# Patient Record
Sex: FEMALE | Race: WHITE | NOT HISPANIC OR LATINO | Employment: OTHER | URBAN - METROPOLITAN AREA
[De-identification: names, ages, dates, MRNs, and addresses within clinical notes are randomized per-mention and may not be internally consistent; named-entity substitution may affect disease eponyms.]

---

## 2024-07-10 ENCOUNTER — OFFICE VISIT (OUTPATIENT)
Dept: OBSTETRICS AND GYNECOLOGY | Facility: HOSPITAL | Age: 39
End: 2024-07-10
Payer: COMMERCIAL

## 2024-07-10 ENCOUNTER — LAB (OUTPATIENT)
Dept: LAB | Facility: HOSPITAL | Age: 39
End: 2024-07-10
Payer: COMMERCIAL

## 2024-07-10 VITALS
HEIGHT: 66 IN | SYSTOLIC BLOOD PRESSURE: 116 MMHG | BODY MASS INDEX: 27.58 KG/M2 | DIASTOLIC BLOOD PRESSURE: 76 MMHG | HEART RATE: 60 BPM | WEIGHT: 171.6 LBS

## 2024-07-10 DIAGNOSIS — D69.6 THROMBOCYTOPENIA AFFECTING PREGNANCY: ICD-10-CM

## 2024-07-10 DIAGNOSIS — A60.00 GENITAL HERPES SIMPLEX, UNSPECIFIED SITE: ICD-10-CM

## 2024-07-10 DIAGNOSIS — O99.119 THROMBOCYTOPENIA AFFECTING PREGNANCY: ICD-10-CM

## 2024-07-10 DIAGNOSIS — Z98.891 PREVIOUS CESAREAN SECTION: Primary | ICD-10-CM

## 2024-07-10 DIAGNOSIS — O09.529 ANTEPARTUM MULTIGRAVIDA OF ADVANCED MATERNAL AGE: ICD-10-CM

## 2024-07-10 DIAGNOSIS — E07.9 DISEASE OF THYROID GLAND: ICD-10-CM

## 2024-07-10 LAB
DEPRECATED RDW RBC AUTO: 39.7 FL (ref 37–54)
ERYTHROCYTE [DISTWIDTH] IN BLOOD BY AUTOMATED COUNT: 11.7 % (ref 12.3–15.4)
HCT VFR BLD AUTO: 44.2 % (ref 34–46.6)
HGB BLD-MCNC: 14.9 G/DL (ref 12–15.9)
MCH RBC QN AUTO: 31.2 PG (ref 26.6–33)
MCHC RBC AUTO-ENTMCNC: 33.7 G/DL (ref 31.5–35.7)
MCV RBC AUTO: 92.7 FL (ref 79–97)
PLATELET # BLD AUTO: 199 10*3/MM3 (ref 140–450)
PMV BLD AUTO: 12.6 FL (ref 6–12)
RBC # BLD AUTO: 4.77 10*6/MM3 (ref 3.77–5.28)
WBC NRBC COR # BLD AUTO: 4.89 10*3/MM3 (ref 3.4–10.8)

## 2024-07-10 PROCEDURE — 36415 COLL VENOUS BLD VENIPUNCTURE: CPT

## 2024-07-10 PROCEDURE — 85027 COMPLETE CBC AUTOMATED: CPT

## 2024-07-10 RX ORDER — LEVOTHYROXINE SODIUM 0.07 MG/1
1 TABLET ORAL DAILY
COMMUNITY
Start: 2024-06-18

## 2024-07-10 NOTE — LETTER
2024       No Recipients    Patient: Jenna Shankar   YOB: 1985   Date of Visit: 7/10/2024       Dear Anni Rosen,    Thank you for referring Jenna Shankar to me for evaluation. Below is a copy of my consult note.    If you have questions, please do not hesitate to call me. I look forward to following Jenna along with you.         Sincerely,        Susie Baltazar MD        CC:   No Recipients        Maternal/Fetal Medicine Consult Note     Name: Jenna Shankar    : 1985     MRN: [id number]     Referring Provider: DEVYN Rios    Chief Complaint  Advice Only (Preconceptual counseling)    Subjective     History of Present Illness:  Jenna Shankar is a 38 y.o.  Unknown who presents today for evaluation in the setting of planned surrogacy. Patient plans to be a gestational carrier.   Patient with history of two prior uncomplicated pregnancies in  and . Both delivered by CS. The second was affected by mild gestational thrombocytopenia. She did not require steroids and was able to receive a spinal.   She has a history of genital herpes, hypothyroidism and AMA.   Her levothyroxine was recently adjusted. She has not had a recent outbreak of herpes, is not on suppression at this time but is willing to take it.       SYLVESTER: Estimated Date of Delivery: None noted.     ROS:   As noted in HPI.     Past Medical History:   Diagnosis Date   • Disease of thyroid gland     dx age20, hypothyroid   • Genital herpes 2024    outbreak; pt states  has cold sores   • Heterozygous for MTHFR gene mutation       Past Surgical History:   Procedure Laterality Date   • BREAST SURGERY Left     lumpectomy   • CHOLECYSTECTOMY     • TONSILLECTOMY        OB History          2    Para   2    Term   2       0    AB   0    Living   2         SAB   0    IAB   0    Ectopic   0    Molar   0    Multiple   0    Live Births   2                Current Outpatient  "Medications:   •  levothyroxine (SYNTHROID, LEVOTHROID) 75 MCG tablet, Take 1 tablet by mouth Daily., Disp: , Rfl:     Objective     Vital Signs  /76   Pulse 60   Ht 167.6 cm (66\")   Wt 77.8 kg (171 lb 9.6 oz)   LMP 2024 (Exact Date)   Estimated body mass index is 27.7 kg/m² as calculated from the following:    Height as of this encounter: 167.6 cm (66\").    Weight as of this encounter: 77.8 kg (171 lb 9.6 oz).    Physical Exam  NAD   Normal pulmonary effort   Normal habitus     Labs:     CBC:   Lab Results   Component Value Date    WBC 4.89 07/10/2024    HGB 14.9 07/10/2024    HCT 44.2 07/10/2024     07/10/2024           Assessment and Plan     38 y.o.  with plans to be a gestational carrier   I do not see a contraindication to this plan   We discussed the following issues:     Diagnoses and all orders for this visit:    1. Previous  section (Primary)    2. Genital herpes simplex, unspecified site  Assessment & Plan:  Recommend suppression starting at 35 weeks GA       3. Disease of thyroid gland  Assessment & Plan:  Reports well controlled at this point.   Recommend goal TSH < 2.5.   Recommend checking TSH q trimester at a minimum.       4. Thrombocytopenia affecting pregnancy  Assessment & Plan:  Reports history of mild gestational thrombocytopenia.   No history of ITP.   Newborns were unaffected.   CBC today with normal platelets.       Orders:  -     CBC (No Diff); Future    5. Antepartum multigravida of advanced maternal age  Assessment & Plan:  Reviewed increased risk of fetal aneuploidy though this is not applicable in the case of surrogacy.   Discussed the increased risk of preeclampsia, GHTN, GDM, IUGR and  delivery.   Recommend starting a low dose ASA at 12 weeks GA and serial growth scans.            Follow Up  No follow-ups on file.    I spent 30 minutes caring for the patient on the day of service. This included: obtaining or reviewing a separately obtained " medical history, reviewing patient records, performing a medically appropriate exam and/or evaluation, counseling or educating the patient/family/caregiver, ordering medications, labs, and/or procedures and documenting such in the medical record. This does not include time spent on review and interpretation of other tests such as fetal ultrasound or the performance of other procedures such as amniocentesis or CVS.    Susie Baltazar MD FACOG  Maternal Fetal Medicine, [facility] [address]     07/10/2024

## 2024-07-14 NOTE — ASSESSMENT & PLAN NOTE
Reports well controlled at this point.   Recommend goal TSH < 2.5.   Recommend checking TSH q trimester at a minimum.

## 2024-07-14 NOTE — ASSESSMENT & PLAN NOTE
Reviewed increased risk of fetal aneuploidy though this is not applicable in the case of surrogacy.   Discussed the increased risk of preeclampsia, GHTN, GDM, IUGR and  delivery.   Recommend starting a low dose ASA at 12 weeks GA and serial growth scans.

## 2024-07-14 NOTE — PROGRESS NOTES
"    Maternal/Fetal Medicine Consult Note     Name: Jenna Shankar    : 1985     MRN: [id number]     Referring Provider: DEVYN Rios    Chief Complaint  Advice Only (Preconceptual counseling)    Subjective     History of Present Illness:  Jenna Shankar is a 38 y.o.  Unknown who presents today for evaluation in the setting of planned surrogacy. Patient plans to be a gestational carrier.   Patient with history of two prior uncomplicated pregnancies in  and . Both delivered by CS. The second was affected by mild gestational thrombocytopenia. She did not require steroids and was able to receive a spinal.   She has a history of genital herpes, hypothyroidism and AMA.   Her levothyroxine was recently adjusted. She has not had a recent outbreak of herpes, is not on suppression at this time but is willing to take it.       SYLVESTER: Estimated Date of Delivery: None noted.     ROS:   As noted in HPI.     Past Medical History:   Diagnosis Date    Disease of thyroid gland     dx age20, hypothyroid    Genital herpes 2024    outbreak; pt states  has cold sores    Heterozygous for MTHFR gene mutation       Past Surgical History:   Procedure Laterality Date    BREAST SURGERY Left     lumpectomy    CHOLECYSTECTOMY      TONSILLECTOMY        OB History          2    Para   2    Term   2       0    AB   0    Living   2         SAB   0    IAB   0    Ectopic   0    Molar   0    Multiple   0    Live Births   2                Current Outpatient Medications:     levothyroxine (SYNTHROID, LEVOTHROID) 75 MCG tablet, Take 1 tablet by mouth Daily., Disp: , Rfl:     Objective     Vital Signs  /76   Pulse 60   Ht 167.6 cm (66\")   Wt 77.8 kg (171 lb 9.6 oz)   LMP 2024 (Exact Date)   Estimated body mass index is 27.7 kg/m² as calculated from the following:    Height as of this encounter: 167.6 cm (66\").    Weight as of this encounter: 77.8 kg (171 lb 9.6 oz).    Physical " Exam  NAD   Normal pulmonary effort   Normal habitus     Labs:     CBC:   Lab Results   Component Value Date    WBC 4.89 07/10/2024    HGB 14.9 07/10/2024    HCT 44.2 07/10/2024     07/10/2024           Assessment and Plan     38 y.o.  with plans to be a gestational carrier   I do not see a contraindication to this plan   We discussed the following issues:     Diagnoses and all orders for this visit:    1. Previous  section (Primary)    2. Genital herpes simplex, unspecified site  Assessment & Plan:  Recommend suppression starting at 35 weeks GA       3. Disease of thyroid gland  Assessment & Plan:  Reports well controlled at this point.   Recommend goal TSH < 2.5.   Recommend checking TSH q trimester at a minimum.       4. Thrombocytopenia affecting pregnancy  Assessment & Plan:  Reports history of mild gestational thrombocytopenia.   No history of ITP.   Newborns were unaffected.   CBC today with normal platelets.       Orders:  -     CBC (No Diff); Future    5. Antepartum multigravida of advanced maternal age  Assessment & Plan:  Reviewed increased risk of fetal aneuploidy though this is not applicable in the case of surrogacy.   Discussed the increased risk of preeclampsia, GHTN, GDM, IUGR and  delivery.   Recommend starting a low dose ASA at 12 weeks GA and serial growth scans.            Follow Up  No follow-ups on file.    I spent 30 minutes caring for the patient on the day of service. This included: obtaining or reviewing a separately obtained medical history, reviewing patient records, performing a medically appropriate exam and/or evaluation, counseling or educating the patient/family/caregiver, ordering medications, labs, and/or procedures and documenting such in the medical record. This does not include time spent on review and interpretation of other tests such as fetal ultrasound or the performance of other procedures such as amniocentesis or CVS.    Susie Baltazar,  MD WHITE  Maternal Fetal Medicine, [facility]     07/10/2024

## 2024-07-14 NOTE — ASSESSMENT & PLAN NOTE
Reports history of mild gestational thrombocytopenia.   No history of ITP.   Newborns were unaffected.   CBC today with normal platelets.

## 2024-07-31 ENCOUNTER — TELEPHONE (OUTPATIENT)
Dept: OBSTETRICS AND GYNECOLOGY | Facility: HOSPITAL | Age: 39
End: 2024-07-31
Payer: COMMERCIAL

## 2024-07-31 NOTE — TELEPHONE ENCOUNTER
PT STATES NEEDS ADDITIONAL NOTES FAXED TO [facility]. PT STATES DR SAMS HAS EMAIL AND FAX NUMBER ALREADY.

## 2025-02-07 ENCOUNTER — TRANSCRIBE ORDERS (OUTPATIENT)
Dept: LAB | Facility: HOSPITAL | Age: 40
End: 2025-02-07

## 2025-02-07 ENCOUNTER — LAB (OUTPATIENT)
Dept: LAB | Facility: HOSPITAL | Age: 40
End: 2025-02-07

## 2025-02-07 ENCOUNTER — APPOINTMENT (OUTPATIENT)
Dept: LAB | Facility: HOSPITAL | Age: 40
End: 2025-02-07

## 2025-02-07 DIAGNOSIS — Z33.3 SURROGATE PREGNANCY: ICD-10-CM

## 2025-02-07 DIAGNOSIS — Z33.3 SURROGATE PREGNANCY: Primary | ICD-10-CM

## 2025-02-07 LAB
ESTRADIOL SERPL HS-MCNC: 184 PG/ML
HCG INTACT+B SERPL-ACNC: 65.54 MIU/ML
PROGEST SERPL-MCNC: 17.6 NG/ML

## 2025-02-07 PROCEDURE — 36415 COLL VENOUS BLD VENIPUNCTURE: CPT

## 2025-02-07 PROCEDURE — 84144 ASSAY OF PROGESTERONE: CPT

## 2025-02-07 PROCEDURE — 84702 CHORIONIC GONADOTROPIN TEST: CPT

## 2025-02-07 PROCEDURE — 82670 ASSAY OF TOTAL ESTRADIOL: CPT

## 2025-02-12 ENCOUNTER — TRANSCRIBE ORDERS (OUTPATIENT)
Dept: LAB | Facility: HOSPITAL | Age: 40
End: 2025-02-12

## 2025-02-12 ENCOUNTER — LAB (OUTPATIENT)
Dept: LAB | Facility: HOSPITAL | Age: 40
End: 2025-02-12

## 2025-02-12 DIAGNOSIS — Z34.00 SUPERVISION OF NORMAL FIRST PREGNANCY, ANTEPARTUM: Primary | ICD-10-CM

## 2025-02-12 DIAGNOSIS — Z34.00 SUPERVISION OF NORMAL FIRST PREGNANCY, ANTEPARTUM: ICD-10-CM

## 2025-02-12 LAB
HCG INTACT+B SERPL-ACNC: 605 MIU/ML
PROGEST SERPL-MCNC: 24.2 NG/ML

## 2025-02-12 PROCEDURE — 84702 CHORIONIC GONADOTROPIN TEST: CPT

## 2025-02-12 PROCEDURE — 36415 COLL VENOUS BLD VENIPUNCTURE: CPT

## 2025-02-12 PROCEDURE — 84144 ASSAY OF PROGESTERONE: CPT

## 2025-03-23 ENCOUNTER — HOSPITAL ENCOUNTER (EMERGENCY)
Facility: HOSPITAL | Age: 40
Discharge: HOME | End: 2025-03-24
Attending: EMERGENCY MEDICINE | Admitting: EMERGENCY MEDICINE
Payer: COMMERCIAL

## 2025-03-23 DIAGNOSIS — R10.32 ACUTE BILATERAL LOWER ABDOMINAL PAIN: Primary | ICD-10-CM

## 2025-03-23 DIAGNOSIS — R10.31 ACUTE BILATERAL LOWER ABDOMINAL PAIN: Primary | ICD-10-CM

## 2025-03-23 DIAGNOSIS — N93.9 VAGINAL BLEEDING: ICD-10-CM

## 2025-03-23 LAB
ABO GROUP BLD: NORMAL
BASOPHILS # BLD AUTO: 0.03 10*3/MM3 (ref 0–0.2)
BASOPHILS NFR BLD AUTO: 0.3 % (ref 0–1.5)
DEPRECATED RDW RBC AUTO: 40.3 FL (ref 37–54)
EOSINOPHIL # BLD AUTO: 0.07 10*3/MM3 (ref 0–0.4)
EOSINOPHIL NFR BLD AUTO: 0.6 % (ref 0.3–6.2)
ERYTHROCYTE [DISTWIDTH] IN BLOOD BY AUTOMATED COUNT: 12 % (ref 12.3–15.4)
HCG INTACT+B SERPL-ACNC: NORMAL MIU/ML
HCT VFR BLD AUTO: 41.7 % (ref 34–46.6)
HGB BLD-MCNC: 14.1 G/DL (ref 12–15.9)
IMM GRANULOCYTES # BLD AUTO: 0.05 10*3/MM3 (ref 0–0.05)
IMM GRANULOCYTES NFR BLD AUTO: 0.4 % (ref 0–0.5)
LYMPHOCYTES # BLD AUTO: 1.12 10*3/MM3 (ref 0.7–3.1)
LYMPHOCYTES NFR BLD AUTO: 9.4 % (ref 19.6–45.3)
MCH RBC QN AUTO: 31.1 PG (ref 26.6–33)
MCHC RBC AUTO-ENTMCNC: 33.8 G/DL (ref 31.5–35.7)
MCV RBC AUTO: 91.9 FL (ref 79–97)
MONOCYTES # BLD AUTO: 0.56 10*3/MM3 (ref 0.1–0.9)
MONOCYTES NFR BLD AUTO: 4.7 % (ref 5–12)
NEUTROPHILS NFR BLD AUTO: 10.06 10*3/MM3 (ref 1.7–7)
NEUTROPHILS NFR BLD AUTO: 84.6 % (ref 42.7–76)
NRBC BLD AUTO-RTO: 0 /100 WBC (ref 0–0.2)
PLATELET # BLD AUTO: 159 10*3/MM3 (ref 140–450)
PMV BLD AUTO: 12.1 FL (ref 6–12)
RBC # BLD AUTO: 4.54 10*6/MM3 (ref 3.77–5.28)
RH BLD: POSITIVE
WBC NRBC COR # BLD AUTO: 11.89 10*3/MM3 (ref 3.4–10.8)

## 2025-03-23 PROCEDURE — 86901 BLOOD TYPING SEROLOGIC RH(D): CPT | Performed by: EMERGENCY MEDICINE

## 2025-03-23 PROCEDURE — 86900 BLOOD TYPING SEROLOGIC ABO: CPT | Performed by: EMERGENCY MEDICINE

## 2025-03-23 PROCEDURE — 85025 COMPLETE CBC W/AUTO DIFF WBC: CPT | Performed by: EMERGENCY MEDICINE

## 2025-03-23 PROCEDURE — 36415 COLL VENOUS BLD VENIPUNCTURE: CPT

## 2025-03-23 PROCEDURE — 99284 EMERGENCY DEPT VISIT MOD MDM: CPT

## 2025-03-23 PROCEDURE — 84702 CHORIONIC GONADOTROPIN TEST: CPT | Performed by: EMERGENCY MEDICINE

## 2025-03-23 RX ORDER — ESTERIFIED ESTROGEN AND METHYLTESTOSTERONE .625; 1.25 MG/1; MG/1
1 TABLET ORAL DAILY
COMMUNITY

## 2025-03-24 ENCOUNTER — APPOINTMENT (OUTPATIENT)
Dept: ULTRASOUND IMAGING | Facility: HOSPITAL | Age: 40
End: 2025-03-24
Payer: COMMERCIAL

## 2025-03-24 VITALS
WEIGHT: 182 LBS | BODY MASS INDEX: 29.25 KG/M2 | RESPIRATION RATE: 16 BRPM | TEMPERATURE: 98.4 F | DIASTOLIC BLOOD PRESSURE: 69 MMHG | OXYGEN SATURATION: 98 % | SYSTOLIC BLOOD PRESSURE: 115 MMHG | HEIGHT: 66 IN | HEART RATE: 64 BPM

## 2025-03-24 LAB — NUMBER OF DOSES: NORMAL

## 2025-03-24 PROCEDURE — 76817 TRANSVAGINAL US OBSTETRIC: CPT

## 2025-03-24 NOTE — ED PROVIDER NOTES
[place]    EMERGENCY DEPARTMENT ENCOUNTER      Pt Name: Jenna Shankar  MRN: [id number]  YOB: 1985  Date of evaluation: 3/23/2025  Provider: Wes Merrill MD    CHIEF COMPLAINT       Chief Complaint   Patient presents with    Vaginal Bleeding - Pregnant         HISTORY OF PRESENT ILLNESS   Jenna Shankar is a 39 y.o. female who presents to the emergency department with lower abdominal cramping and vaginal bleeding over the course the past day.  Patient is a surrogate.  She estimates she is about 10 weeks pregnant.  Was previously evaluated in the [place] emergency department for similar complaints and reports having an ultrasound that looked okay.  She feels that she passed some fetal tissue on the way here.  She has no ongoing abdominal pain, urinary symptoms, fever, or chills.      Nursing notes were reviewed.    REVIEW OF SYSTEMS     ROS:  A chief complaint appropriate review of systems was completed and is negative except as noted in the HPI.      PAST MEDICAL HISTORY     Past Medical History:   Diagnosis Date    Disease of thyroid gland     dx age18, hypothyroid    Genital herpes 04/2024    outbreak; pt states  has cold sores    Heterozygous for MTHFR gene mutation     Ovarian cyst          SURGICAL HISTORY       Past Surgical History:   Procedure Laterality Date    BREAST SURGERY Left     lumpectomy    CHOLECYSTECTOMY      TONSILLECTOMY           CURRENT MEDICATIONS     No current facility-administered medications for this encounter.    Current Outpatient Medications:     estrogens, conjugated,-methyltestosterone (ESTRATEST HS) 0.625-1.25 MG per tablet, Take 1 tablet by mouth Daily. (Patient not taking: Reported on 3/25/2025), Disp: , Rfl:     levothyroxine (SYNTHROID, LEVOTHROID) 112 MCG tablet, Take 1 tablet by mouth Daily., Disp: , Rfl:     ALLERGIES     Codeine and Penicillins    FAMILY HISTORY       Family History   Problem Relation Age of Onset    Breast cancer Maternal  Aunt     Ovarian cancer Neg Hx     Uterine cancer Neg Hx     Colon cancer Neg Hx           SOCIAL HISTORY       Social History     Socioeconomic History    Marital status:    Tobacco Use    Smoking status: Never    Smokeless tobacco: Never   Vaping Use    Vaping status: Never Used   Substance and Sexual Activity    Alcohol use: Not Currently    Drug use: Never    Sexual activity: Yes     Partners: Male         PHYSICAL EXAM    (up to 7 for level 4, 8 or more for level 5)     Vitals:    03/24/25 0200 03/24/25 0230 03/24/25 0300 03/24/25 0341   BP: 107/73 108/68 109/75 115/69   Pulse: 68 63 64    Resp:       Temp:       SpO2: 94% 97% 98%    Weight:       Height:           General: Awake, alert, no acute distress.  HEENT: Conjunctivae normal.  Neck: Trachea midline.  Cardiac: Heart regular rate, rhythm, no murmurs, rubs, or gallops  Lungs: Lungs are clear to auscultation, there is no wheezing, rhonchi, or rales. There is no use of accessory muscles.  Chest wall: There is no tenderness to palpation over the chest wall or over ribs  Abdomen: Abdomen is soft, nontender, nondistended. There are no firm or pulsatile masses, no rebound rigidity or guarding.   Musculoskeletal: No deformity.  Neuro: Alert and oriented x 4.  Dermatology: Skin is warm and dry  Psych: Mentation is grossly normal, cognition is grossly normal. Affect is appropriate.        DIAGNOSTIC RESULTS     EKG: All EKGs are interpreted by the Emergency Department Physician who either signs or Co-signs this chart in the absence of a cardiologist.    No orders to display         RADIOLOGY:   [x] Radiologist's Report Reviewed:  US Ob Transvaginal   Final Result   Impression:   No intrauterine gestational sac identified. No suspicious adnexal mass. Small cystic structure containing internal echoes is seen within the right ovary likely representing a small hemorrhagic cyst measuring up to 1.8 cm.            Electronically Signed: Tania Kramer MD      3/24/2025 4:03 AM EDT     Workstation ID: [id number]          I ordered and independently reviewed the above noted radiographic studies.        LABS:    I have reviewed and interpreted all of the currently available lab results from this visit (if applicable):  Results for orders placed or performed during the hospital encounter of 25   hCG, Quantitative, Pregnancy    Collection Time: 25  9:20 PM    Specimen: Blood   Result Value Ref Range    HCG Quantitative 30,767.00 mIU/mL   CBC Auto Differential    Collection Time: 25  9:20 PM    Specimen: Blood   Result Value Ref Range    WBC 11.89 (H) 3.40 - 10.80 10*3/mm3    RBC 4.54 3.77 - 5.28 10*6/mm3    Hemoglobin 14.1 12.0 - 15.9 g/dL    Hematocrit 41.7 34.0 - 46.6 %    MCV 91.9 79.0 - 97.0 fL    MCH 31.1 26.6 - 33.0 pg    MCHC 33.8 31.5 - 35.7 g/dL    RDW 12.0 (L) 12.3 - 15.4 %    RDW-SD 40.3 37.0 - 54.0 fl    MPV 12.1 (H) 6.0 - 12.0 fL    Platelets 159 140 - 450 10*3/mm3    Neutrophil % 84.6 (H) 42.7 - 76.0 %    Lymphocyte % 9.4 (L) 19.6 - 45.3 %    Monocyte % 4.7 (L) 5.0 - 12.0 %    Eosinophil % 0.6 0.3 - 6.2 %    Basophil % 0.3 0.0 - 1.5 %    Immature Grans % 0.4 0.0 - 0.5 %    Neutrophils, Absolute 10.06 (H) 1.70 - 7.00 10*3/mm3    Lymphocytes, Absolute 1.12 0.70 - 3.10 10*3/mm3    Monocytes, Absolute 0.56 0.10 - 0.90 10*3/mm3    Eosinophils, Absolute 0.07 0.00 - 0.40 10*3/mm3    Basophils, Absolute 0.03 0.00 - 0.20 10*3/mm3    Immature Grans, Absolute 0.05 0.00 - 0.05 10*3/mm3    nRBC 0.0 0.0 - 0.2 /100 WBC    RhIg Evaluation    Collection Time: 25  9:51 PM    Specimen: Blood   Result Value Ref Range    ABO Type B     RH type Positive    Doses of Rh Immune Globulin    Collection Time: 25  9:51 PM    Specimen: Blood   Result Value Ref Range    Number of Doses       RhIg is not indicated due to the patient's Rh status        If labs were ordered, I independently reviewed the results and considered them in treating the  patient.      EMERGENCY DEPARTMENT COURSE and DIFFERENTIAL DIAGNOSIS/MDM:   Vitals:  AS OF 10:48 EDT    BP - 115/69  HR - 64  TEMP - 98.4 °F (36.9 °C)  O2 SATS - 98%        Discussion below represents my analysis of pertinent findings related to patient's condition, differential diagnosis, treatment plan and final disposition.      Differential diagnosis:  The differential diagnosis associated with the patient's presentation includes: Miscarriage, threatened miscarriage, subchorionic hemorrhage, ectopic pregnancy      Independent interpretations (ECG/rhythm strip/X-ray/US/CT scan): I independently interpreted the patient's cardiac monitor which showed sinus rhythm.  I personally performed bedside transabdominal ultrasound and was not able to identify a fetus within the uterus.      Patient's care impacted by:   [] Diabetes   [] Hypertension   [] Coronary Artery Disease   [] Cancer   [x] Other: Pregnant    Care significantly affected by Social Determinants of Health (housing and economic circumstances, unemployment)    [] Yes     [x] No   If yes, Patient's care significantly limited by  Social Determinants of Health including:    [] Inadequate housing    [] Low income    [] Alcoholism and drug addiction in family    [] Problems related to primary support group    [] Unemployment    [] Problems related to employment    [] Other Social Determinants of Health:       ED Course:    ED Course as of 03/27/25 1048   Mon Mar 24, 2025   0212 Assumed care of this patient awaiting transvaginal obstetric ultrasound to evaluate for fetal viability.  Patient's beta-hCG level appropriately elevated for this stage of pregnancy, B+ blood type, no indication for RhoGAM. [KB]   0212 This patient's care is being signed over to Raheem Ortiz MD. we are currently waiting on the ultrasound technician.  She has been contacted by the patient's nurse and has a study to complete at another facility and they will be coming here to complete the  patient's transvaginal ultrasound. [NS]   0435 Ultrasound of the pelvis independently interpreted by myself demonstrates no visualized intrauterine gestational sac or fetal pole.  Radiologist identifies small cystic structure within the right ovary likely representing a small hemorrhagic cyst [KB]   9621 I discussed the ultrasound findings with the patient.  She tells me that as recently as Friday she had a ultrasound conducted which demonstrated an intrauterine pregnancy with an identifiable fetal heart rate.  Given this, the ultrasound results today, and the clinical history over the past 48 hours patient has likely suffered a miscarriage.  She has a follow-up with her OB/GYN doctor on Tuesday.  Discharge from the ER in good condition [KB]      ED Course User Index  [KB] Raheem Ortiz MD  [NS] Wes Merrill MD           I had a discussion with the patient/family regarding diagnosis, diagnostic results, treatment plan, and medications.  The patient/family indicated understanding of these instructions.  I spent adequate time at the bedside preceding discharge necessary to personally discuss the aftercare instructions, giving patient education, providing explanations of the results of our evaluations/findings, and my decision making to assure that the patient/family understand the plan of care.  Time was allotted to answer questions at that time and throughout the ED course.  Emphasis was placed on timely follow-up after discharge.  I also discussed the potential for the development of an acute emergent condition requiring further evaluation, admission, or even surgical intervention. I discussed that we found nothing during the visit today indicating the need for further workup, admission, or the presence of an unstable medical condition.  I encouraged the patient to return to the emergency department immediately for ANY concerns, worsening, new complaints, or if symptoms persist and unable to seek follow-up in  a timely fashion.  The patient/family expressed understanding and agreement with this plan.  The patient will follow-up with their PCP in 1-2 days for reevaluation.           PROCEDURES:  Procedures    CRITICAL CARE TIME        FINAL IMPRESSION      1. Acute bilateral lower abdominal pain    2. Vaginal bleeding          DISPOSITION/PLAN     ED Disposition       ED Disposition   Discharge    Condition   Stable    Comment   --                 Comment: Please note this report has been produced using speech recognition software.      Wes Merrill MD  Attending Emergency Physician             Wes Merrill MD  03/27/25 1045

## 2025-03-25 ENCOUNTER — OFFICE VISIT (OUTPATIENT)
Dept: OBSTETRICS AND GYNECOLOGY | Facility: CLINIC | Age: 40
End: 2025-03-25
Payer: COMMERCIAL

## 2025-03-25 VITALS
BODY MASS INDEX: 29.12 KG/M2 | HEIGHT: 66 IN | WEIGHT: 181.2 LBS | SYSTOLIC BLOOD PRESSURE: 106 MMHG | DIASTOLIC BLOOD PRESSURE: 68 MMHG

## 2025-03-25 DIAGNOSIS — O03.9 MISCARRIAGE: Primary | ICD-10-CM

## 2025-03-25 RX ORDER — LEVOTHYROXINE SODIUM 112 UG/1
1 TABLET ORAL DAILY
COMMUNITY
Start: 2025-03-01

## 2025-03-25 NOTE — PROGRESS NOTES
"Subjective   Chief Complaint   Patient presents with    Establish Care    Follow-up     Possible miscarriage. Was cramping last night, is still bleeding but it is getting lighter.     Jenna Shankar is a 39 y.o. year old .   Patient's last menstrual period was 2024 (exact date). Patient is a surrogate with an IVF pregnancy and has had multiple ultrasounds confirming IUP with fetal cardiac activity. She started to have vaginal bleeding on  that worsened yesterday and she presented to ED yesterday. She had ultrasound that demonstrated no further gestational sac in the uterus, with EMT of 1.5 cm. She has continued to have some bleeding but it is getting lighter. No other problems.     History of 2 prior  sections. No other pregnancies.     The following portions of the patient's history were reviewed and updated as appropriate:current medications and allergies    Social History    Tobacco Use      Smoking status: Never      Smokeless tobacco: Never         Objective   /68   Ht 167.6 cm (65.98\")   Wt 82.2 kg (181 lb 3.2 oz)   LMP 2024 (Exact Date)   BMI 29.26 kg/m²     General:  well developed; well nourished  no acute distress  mentation appropriate   Skin:  No suspicious lesions seen   Lungs:  breathing is unlabored   Heart:  Not performed.   Abdomen: soft, non-tender; no masses   Pelvis: Clinical staff was present for exam  Normal external female genitalia. Pregnancy tissue seen in vaginal vault and removed with ring forceps. Pregnancy tissue seen in cervix, removed with ring forceps. No bleeding after removal. Cervix dilated 1 cm.  No tenderness on bimanual exam.      CBC:   Lab Results   Component Value Date    WBC 11.89 (H) 2025    HGB 14.1 2025    HCT 41.7 2025     2025     HCG:   Lab Results   Component Value Date    HCGQUANT 30,767.00 2025     TVUS 3/24/25  Findings:  Uterus. Normal in size. Endometrial thickness is 1.5 cm. No " myometrial lesions identified. Ovaries appear normal in size. Normal vascular flow is present. Small cystic structure seen arising from the right ovary measuring up to 1.8 cm with internal   debris, likely representing a small hemorrhagic cyst. No yolk sac or fetal pole identified. No adnexal masses identified on the left. Benign-appearing follicles are present. Trace amount of free fluid present within the pelvic cul-de-sac.     IMPRESSION:  Impression:  No intrauterine gestational sac identified. No suspicious adnexal mass. Small cystic structure containing internal echoes is seen within the right ovary likely representing a small hemorrhagic cyst measuring up to 1.8 cm.       Assessment   Spontaneous       Plan   Discussed that SAB is likely now complete after removal of pregnancy from cervix but patient may have some bleeding   Follow up next week to check on bleeding. Patient instructed to call for heavy bleeding.   Discussed that we will send POC to pathology. She plans to contact intended parents to ask about any further testing that is desired. Of note, preimplantation genetic testing was performed.   Discussed no intercourse or trying for pregnancy until negative pregnancy test, but could try to be surrogate in future once this pregnancy has completely resolved.     No orders of the defined types were placed in this encounter.         This note was electronically signed.    Tamara Schneider MD  Obstetrics and Gynecology  [facility]

## 2025-03-27 LAB — REF LAB TEST METHOD: NORMAL

## 2025-04-02 ENCOUNTER — OFFICE VISIT (OUTPATIENT)
Dept: OBSTETRICS AND GYNECOLOGY | Facility: CLINIC | Age: 40
End: 2025-04-02
Payer: COMMERCIAL

## 2025-04-02 ENCOUNTER — LAB (OUTPATIENT)
Dept: LAB | Facility: HOSPITAL | Age: 40
End: 2025-04-02
Payer: COMMERCIAL

## 2025-04-02 ENCOUNTER — RESULTS FOLLOW-UP (OUTPATIENT)
Dept: OBSTETRICS AND GYNECOLOGY | Facility: CLINIC | Age: 40
End: 2025-04-02

## 2025-04-02 VITALS
WEIGHT: 180.4 LBS | SYSTOLIC BLOOD PRESSURE: 118 MMHG | BODY MASS INDEX: 28.99 KG/M2 | HEIGHT: 66 IN | DIASTOLIC BLOOD PRESSURE: 74 MMHG

## 2025-04-02 DIAGNOSIS — O03.9 MISCARRIAGE: ICD-10-CM

## 2025-04-02 DIAGNOSIS — E07.9 DISEASE OF THYROID GLAND: ICD-10-CM

## 2025-04-02 DIAGNOSIS — E07.9 DISEASE OF THYROID GLAND: Primary | ICD-10-CM

## 2025-04-02 LAB
HCG INTACT+B SERPL-ACNC: 215 MIU/ML
TSH SERPL DL<=0.05 MIU/L-ACNC: 1.84 UIU/ML (ref 0.27–4.2)

## 2025-04-02 PROCEDURE — 36415 COLL VENOUS BLD VENIPUNCTURE: CPT

## 2025-04-02 PROCEDURE — 84443 ASSAY THYROID STIM HORMONE: CPT

## 2025-04-02 PROCEDURE — 84702 CHORIONIC GONADOTROPIN TEST: CPT

## 2025-04-02 RX ORDER — MISOPROSTOL 200 UG/1
800 TABLET ORAL ONCE
Qty: 4 TABLET | Refills: 0 | Status: SHIPPED | OUTPATIENT
Start: 2025-04-02 | End: 2025-04-02

## 2025-04-02 RX ORDER — PRENATAL VIT NO.130/IRON/FOLIC 27MG-0.8MG
1 TABLET ORAL DAILY
COMMUNITY

## 2025-04-02 NOTE — PROGRESS NOTES
"Subjective   Chief Complaint   Patient presents with    Follow-up     MAB.  Ultrasound today.  Bleeding has stopped.      Jenna Shankar is a 39 y.o. year old  who presents for follow up from miscarriage. Patient is a surrogate with an IVF pregnancy and had multiple ultrasounds confirming IUP with fetal cardiac activity. At her last visit, she had products of conception in the cervix which were removed with ring forceps and sent to pathology, which confirmed products of conception. She has continued to have some bleeding since removal, but no bleeding today.     She had ultrasound today that demonstrated heterogeneous endometrium measuring 12.6 mm.       History of 2 prior  sections. No other pregnancies.     The following portions of the patient's history were reviewed and updated as appropriate:current medications and allergies    Social History    Tobacco Use      Smoking status: Never      Smokeless tobacco: Never         Objective   /74   Ht 167.6 cm (66\")   Wt 81.8 kg (180 lb 6.4 oz)   BMI 29.12 kg/m²     General:  well developed; well nourished  no acute distress  mentation appropriate   Skin:  No suspicious lesions seen   Lungs:  breathing is unlabored   Heart:  Not performed.   Abdomen: soft, non-tender; no masses   Pelvis: Not performed.No tenderness on bimanual exam.      CBC:   Lab Results   Component Value Date    WBC 11.89 (H) 2025    HGB 14.1 2025    HCT 41.7 2025     2025     HCG:   Lab Results   Component Value Date    HCGQUANT 30,767.00 2025        Assessment   Spontaneous   Suspected retained products of conception    Thyroid disease     Plan   Discussed that there appears to be some tissue on transvaginal ultrasound, consistent with either blood clot or retained products of conception.   Recommend repeat beta hcg today   Discussed options of medical or surgical management of suspected retained products of conception. Discussed " risks and benefits of each, including bleeding at home with cytotec, 80% success rate with medical management and possible need for surgical management if unsuccessful. Discussed suction dilation and curettage procedure in detail. Patient desires to proceed with medical management.   Will recheck TSH today with blood draw     New Medications Ordered This Visit   Medications    miSOPROStol (Cytotec) 200 MCG tablet     Sig: Take 4 tablets by mouth 1 time for 1 dose.     Dispense:  4 tablet     Refill:  0        Follow up in 1 week.     This note was electronically signed.    Tamara Schneider MD  Obstetrics and Gynecology  [facility]

## 2025-04-07 ENCOUNTER — TELEPHONE (OUTPATIENT)
Dept: OBSTETRICS AND GYNECOLOGY | Facility: CLINIC | Age: 40
End: 2025-04-07
Payer: COMMERCIAL

## 2025-04-07 NOTE — TELEPHONE ENCOUNTER
Pt called and stated that she took the Cytotec and she had some pain / cramping, but has not had any bleeding and would like to know what she should do next.    Please advise.

## 2025-04-08 RX ORDER — MISOPROSTOL 200 UG/1
800 TABLET ORAL ONCE
Qty: 4 TABLET | Refills: 0 | Status: SHIPPED | OUTPATIENT
Start: 2025-04-08 | End: 2025-04-08

## 2025-04-08 NOTE — TELEPHONE ENCOUNTER
Cytotec refilled for one additional dose. If this does not work, then we will need to talk about D&C at visit later this week.     Tamara Schneider MD  Obstetrics and Gynecology  [facility]

## 2025-04-09 NOTE — TELEPHONE ENCOUNTER
Pt informed and stated understanding.      Pt reports that she was told to do her medication vaginally rather than orally by her fertility clinic. She would like to know which way she should do this. She has been doing this how it was prescribed by Dr. Schneider and just did not know if this time she should try the medication vaginally.

## 2025-04-11 ENCOUNTER — OFFICE VISIT (OUTPATIENT)
Dept: OBSTETRICS AND GYNECOLOGY | Facility: CLINIC | Age: 40
End: 2025-04-11
Payer: COMMERCIAL

## 2025-04-11 VITALS
SYSTOLIC BLOOD PRESSURE: 120 MMHG | WEIGHT: 181.4 LBS | HEIGHT: 66 IN | DIASTOLIC BLOOD PRESSURE: 78 MMHG | BODY MASS INDEX: 29.15 KG/M2

## 2025-04-11 DIAGNOSIS — O03.9 MISCARRIAGE: Primary | ICD-10-CM

## 2025-04-11 LAB
B-HCG UR QL: POSITIVE
EXPIRATION DATE: ABNORMAL
INTERNAL NEGATIVE CONTROL: NEGATIVE
INTERNAL POSITIVE CONTROL: POSITIVE
Lab: ABNORMAL

## 2025-04-11 NOTE — PROGRESS NOTES
"Subjective   Chief Complaint   Patient presents with    Follow-up     MAB f/u / ended up not bleeding with the second dose of the Cytotec      Jenna Shankar is a 39 y.o. year old  who presents for follow up from miscarriage. She had products of conception in the cervix which were removed with ring forceps and sent to pathology, which confirmed products of conception. At last visit, patient had ultrasound that demonstrated heterogeneous endometrium measuring 12.6 mm. She took 2 doses of cytotec and did not really have much bleeding. Had passed small amount of blood clot but otherwise no symptoms.     History of 2 prior  sections. No other pregnancies.               Objective   /78   Ht 167.6 cm (65.98\")   Wt 82.3 kg (181 lb 6.4 oz)   BMI 29.29 kg/m²     General:  well developed; well nourished  no acute distress  mentation appropriate   Skin:  No suspicious lesions seen   Lungs:  breathing is unlabored   Heart:  Not performed.   Abdomen: soft, non-tender; no masses   Pelvis: Not performed.      CBC:   Lab Results   Component Value Date    WBC 11.89 (H) 2025    HGB 14.1 2025    HCT 41.7 2025     2025     HCG:   Lab Results   Component Value Date    HCGQUANT 215.00 2025       UPT positive in office   TVUS demonstrated a small amount of debris in endometrium. EMT is 5.0 mm     Assessment   Spontaneous , resolving       Plan   Discussed that there appears to be minimal amount of tissue in endometrium at this point. I do not think we need to proceed with surgical management at this time as EMT is significantly reduced compared to last week.   Will check beta hcg level today. Discussed that we will base plan on hcg level, but we need to follow until negative before trying for pregnancy again (IVF surrogate)     No orders of the defined types were placed in this encounter.       This note was electronically signed.    Tamara Schneider MD  Obstetrics and " Gynecology  [facility]

## 2025-04-14 DIAGNOSIS — O03.9 MISCARRIAGE: Primary | ICD-10-CM

## 2025-04-14 NOTE — TELEPHONE ENCOUNTER
Spoke to pt to get a fax number to fax the order to. Pt does not have one, so she is just going to come up to the hospital lab to get the test done.

## 2025-04-15 ENCOUNTER — LAB (OUTPATIENT)
Dept: LAB | Facility: HOSPITAL | Age: 40
End: 2025-04-15
Payer: COMMERCIAL

## 2025-04-15 DIAGNOSIS — O03.9 MISCARRIAGE: ICD-10-CM

## 2025-04-15 LAB — HCG INTACT+B SERPL-ACNC: 11.32 MIU/ML

## 2025-04-15 PROCEDURE — 84702 CHORIONIC GONADOTROPIN TEST: CPT

## 2025-05-09 ENCOUNTER — OFFICE VISIT (OUTPATIENT)
Dept: OBSTETRICS AND GYNECOLOGY | Facility: CLINIC | Age: 40
End: 2025-05-09
Payer: COMMERCIAL

## 2025-05-09 VITALS
HEIGHT: 66 IN | WEIGHT: 184.8 LBS | DIASTOLIC BLOOD PRESSURE: 70 MMHG | SYSTOLIC BLOOD PRESSURE: 108 MMHG | BODY MASS INDEX: 29.7 KG/M2

## 2025-05-09 DIAGNOSIS — N84.0 ENDOMETRIAL POLYP: Primary | ICD-10-CM

## 2025-05-09 DIAGNOSIS — Z31.7 ENCOUNTER FOR PROCREATIVE MANAGEMENT AND COUNSELING FOR GESTATIONAL CARRIER: ICD-10-CM

## 2025-05-09 RX ORDER — LEVOTHYROXINE SODIUM 112 UG/1
112 TABLET ORAL DAILY
Qty: 90 TABLET | Refills: 3 | Status: SHIPPED | OUTPATIENT
Start: 2025-05-09

## 2025-05-09 NOTE — PROGRESS NOTES
"Subjective   Chief Complaint   Patient presents with    Follow-up     Hysteroscopy consult     Jenna Shankar is a 39 y.o. year old  presenting to be seen for surgery consult.     History of Present Illness  The patient presents for discussion of polyp removal. 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 She has been diagnosed with a uterine polyp based on SIS in their office, which necessitates removal prior to her next embryo transfer. She reports no instances of irregular bleeding but does have history of heavy menses. She requests a refill for her Synthroid prescription, as she has exhausted her current supply.      Had recent MAB (as gestational carrier). Has had two prior  sections.          Objective   /70   Ht 167.6 cm (65.98\")   Wt 83.8 kg (184 lb 12.8 oz)   BMI 29.84 kg/m²     General:  well developed; well nourished  no acute distress  mentation appropriate   Skin:  No suspicious lesions seen   Thyroid: not examined   Breasts:  Not performed.   Abdomen: Not performed.   Pelvis: Not performed.       Assessment & Plan  1. Endometrial polyp.  A comprehensive discussion was held regarding the hysteroscopy procedure for polyp removal. The procedure involves using a camera to visualize the uterus and remove the polyp under direct visualization. The polyp will be sent to pathology to rule out cancer or precancerous conditions. The procedure is expected to take 20 minutes, and discharge will be on the same day. Post-procedure restrictions include pelvic rest for about 2 weeks. Potential side effects such as low risk of infection, bleeding, and watery discharge were discussed. Pain management typically involves Tylenol and ibuprofen. A message will be sent to schedule the procedure.    Medication refill for Synthroid was provided.        New Medications Ordered This Visit   Medications    levothyroxine (SYNTHROID, LEVOTHROID) 112 MCG " tablet     Sig: Take 1 tablet by mouth Daily.     Dispense:  90 tablet     Refill:  3          This note was electronically signed.          Patient or patient representative verbalized consent for the use of Ambient Listening during the visit with  Tamara Schneider MD for chart documentation. 5/9/2025  15:49 EDT    Tamara Schneider MD  Obstetrics and Gynecology  [facility]

## 2025-05-09 NOTE — H&P
[facility]   PREOPERATIVE HISTORY AND PHYSICAL    Patient Name:Jenna Shankar  : 1985  MRN: [id number]  Primary Care Physician: Provider, No Known  Date of admission: (Not on file)    Subjective   Subjective     Chief Complaint: preoperative evaluation    History of Present Illness  Jenna Shankar is a 39 y.o. female  who presents for preoperative evaluation. She is scheduled for hysteroscopy dilation and curettage with polypectomy.     The patient presents for discussion of polyp removal. 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 She has been diagnosed with a uterine polyp based on SIS in their office, which necessitates removal prior to her next embryo transfer. She reports no instances of irregular bleeding but does have history of heavy menses. She requests a refill for her Synthroid prescription, as she has exhausted her current supply.       Had recent MAB (as gestational carrier). Has had two prior  sections.     Patient Active Problem List   Diagnosis    Previous  section    Disease of thyroid gland    Genital herpes    Thrombocytopenia affecting pregnancy    Antepartum multigravida of advanced maternal age    Miscarriage    Endometrial polyp       Review of Systems   Constitutional:  Negative for fever.   Genitourinary:  Negative for pelvic pain, vaginal bleeding and vaginal pain.   All other systems reviewed and are negative.       Personal History     Past Medical History:   Diagnosis Date    Disease of thyroid gland     dx age20, hypothyroid    Genital herpes 2024    outbreak; pt states  has cold sores    Heterozygous for MTHFR gene mutation     Ovarian cyst        Past Surgical History:   Procedure Laterality Date    BREAST SURGERY Left     lumpectomy    CHOLECYSTECTOMY      TONSILLECTOMY         Obstetric History:  OB History          3    Para   2    Term   2       0    AB   1     Living   2         SAB   1    IAB   0    Ectopic   0    Molar   0    Multiple   0    Live Births   2               Menstrual History:     No LMP recorded.       # 1 - Date: None, Sex: None, Weight: None, GA: None, Type: None, Apgar1: None, Apgar5: None, Living: None, Birth Comments: None    # 2 - Date: 11/29/10, Sex: Female, Weight: 3600 g (7 lb 15 oz), GA: 40w0d, Type: , Unspecified, Apgar1: None, Apgar5: None, Living: Living, Birth Comments: None    # 3 - Date: [birth year], Sex: Male, Weight: 4054 g (8 lb 15 oz), GA: 39w0d, Type: , Unspecified, Apgar1: None, Apgar5: None, Living: Living, Birth Comments: None      Family History: Her family history includes Breast cancer in her maternal aunt.     Social History: She  reports that she has never smoked. She has never used smokeless tobacco. She reports that she does not currently use alcohol. She reports that she does not use drugs.    Home Medications:  Prenatal Vitamins and levothyroxine    Allergies:  She is allergic to codeine and penicillins.    Objective    Objective     Vitals:    BP: (108)/(70) 108/70    Physical Exam  Constitutional:       Appearance: Normal appearance. She is normal weight.   Pulmonary:      Effort: Pulmonary effort is normal.   Abdominal:      General: Abdomen is flat.      Palpations: Abdomen is soft.   Neurological:      Mental Status: She is alert.         Assessment & Plan   Assessment / Plan     Brief Patient Summary:  Jenna Shankar is a 39 y.o. female who presents for preoperative evaluation.    * No surgery found *    Active Hospital Problems:  There are no active hospital problems to display for this patient.    Plan:   1. Endometrial polyp.  A comprehensive discussion was held regarding the hysteroscopy procedure for polyp removal. The procedure involves using a camera to visualize the uterus and remove the polyp under direct visualization. The polyp will be sent to pathology to rule out cancer or precancerous  conditions. The procedure is expected to take 20 minutes, and discharge will be on the same day. Post-procedure restrictions include pelvic rest for about 2 weeks. Potential side effects such as low risk of infection, bleeding, and watery discharge were discussed. Pain management typically involves Tylenol and ibuprofen. A message will be sent to schedule the procedure.    The risks, benefits, and alternatives of the procedure are reviewed with the patient including but not limited to bleeding, infection and damage to internal organs.    Questions and concerns were addressed.     Tamara Schneider MD

## 2025-06-30 ENCOUNTER — OUTSIDE FACILITY SERVICE (OUTPATIENT)
Dept: OBSTETRICS AND GYNECOLOGY | Facility: CLINIC | Age: 40
End: 2025-06-30
Payer: COMMERCIAL

## 2025-06-30 ENCOUNTER — LAB REQUISITION (OUTPATIENT)
Dept: LAB | Facility: HOSPITAL | Age: 40
End: 2025-06-30
Payer: COMMERCIAL

## 2025-06-30 DIAGNOSIS — N84.0 POLYP OF CORPUS UTERI: ICD-10-CM

## 2025-06-30 PROCEDURE — 88305 TISSUE EXAM BY PATHOLOGIST: CPT | Performed by: STUDENT IN AN ORGANIZED HEALTH CARE EDUCATION/TRAINING PROGRAM

## 2025-06-30 RX ORDER — ACETAMINOPHEN 500 MG
1000 TABLET ORAL EVERY 6 HOURS PRN
Qty: 30 TABLET | Refills: 1 | Status: SHIPPED | OUTPATIENT
Start: 2025-06-30

## 2025-06-30 RX ORDER — IBUPROFEN 600 MG/1
600 TABLET, FILM COATED ORAL EVERY 6 HOURS PRN
Qty: 30 TABLET | Refills: 1 | Status: SHIPPED | OUTPATIENT
Start: 2025-06-30

## 2025-07-01 LAB
CYTO UR: NORMAL
LAB AP CASE REPORT: NORMAL
LAB AP CLINICAL INFORMATION: NORMAL
PATH REPORT.FINAL DX SPEC: NORMAL
PATH REPORT.GROSS SPEC: NORMAL

## 2025-07-14 ENCOUNTER — OFFICE VISIT (OUTPATIENT)
Dept: OBSTETRICS AND GYNECOLOGY | Facility: CLINIC | Age: 40
End: 2025-07-14
Payer: COMMERCIAL

## 2025-07-14 VITALS
HEIGHT: 66 IN | BODY MASS INDEX: 28.93 KG/M2 | DIASTOLIC BLOOD PRESSURE: 82 MMHG | SYSTOLIC BLOOD PRESSURE: 126 MMHG | WEIGHT: 180 LBS

## 2025-07-14 DIAGNOSIS — Z09 POSTOPERATIVE EXAMINATION: Primary | ICD-10-CM

## 2025-07-14 PROCEDURE — 99024 POSTOP FOLLOW-UP VISIT: CPT | Performed by: STUDENT IN AN ORGANIZED HEALTH CARE EDUCATION/TRAINING PROGRAM

## 2025-07-14 NOTE — PROGRESS NOTES
"Subjective   Chief Complaint   Patient presents with    Post-op     2wk post op     Jenna Shankar is a 39 y.o. year old  presenting to be seen for her post-operative visit.  She had HD&C on  for endometrial polyp. Currently she reports no problems with eating, bowel movements, voiding, or wound drainage and pain is well controlled.    The pathology results from her procedure are in Jenna's record and are benign, consistent with polyp. We discussed her surgery in detail, including uterine perforation with blunt camera. Discussed that this should not affect her ability to carry pregnancy.          Objective   /82   Ht 167.6 cm (65.98\")   Wt 81.6 kg (180 lb)   BMI 29.07 kg/m²     General:  well developed; well nourished  no acute distress  mentation appropriate   Abdomen: soft, non-tender; no masses   Pelvis: Not performed.          Assessment   S/P hysteroscopy with Myosure     Plan   May return to full activity with no restrictions  The importance of keeping all planned follow-up and taking all medications as prescribed was emphasized.  Follow up for annual exam or after positive pregnancy test.     No orders of the defined types were placed in this encounter.         Tamara Schneider MD  Obstetrics and Gynecology  [facility]     Answers submitted by the patient for this visit:  Post Operative Visit (Submitted on 2025)  Chief Complaint: Follow-up  Pain Control: no pain  Fever: no fever  Diet: adequate intake  Activity: normal  Operative Site Issues: No    "

## 2025-08-21 ENCOUNTER — TRANSCRIBE ORDERS (OUTPATIENT)
Dept: LAB | Facility: HOSPITAL | Age: 40
End: 2025-08-21
Payer: COMMERCIAL